# Patient Record
Sex: MALE | Race: WHITE
[De-identification: names, ages, dates, MRNs, and addresses within clinical notes are randomized per-mention and may not be internally consistent; named-entity substitution may affect disease eponyms.]

---

## 2018-06-28 ENCOUNTER — HOSPITAL ENCOUNTER (OUTPATIENT)
Dept: HOSPITAL 88 - MRI | Age: 48
End: 2018-06-28
Attending: FAMILY MEDICINE
Payer: COMMERCIAL

## 2018-06-28 DIAGNOSIS — S86.012A: Primary | ICD-10-CM

## 2018-06-28 NOTE — DIAGNOSTIC IMAGING REPORT
MRI of the left ankle without contrast.



History:  Achilles tendon tear. Heel pain. Decreased range of motion.



Technique: Utilizing a high-field 1.5T magnet, the following sequences were

acquired: PD FS in all 3 planes with additional axial PD.

 

Comparison:  None.



Findings: 

Achilles tendon and plantar fascia: There is thickening and degeneration with

mild midsubstance partial tearing involving the distal Achilles tendon adjacent

to the posterior calcaneal insertion site. There is bone marrow edema in the

posterior calcaneus and there is mild retrocalcaneal bursitis and mild adjacent

soft tissue edema. 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

calcaneus. No full-thickness Achilles tendon tear or retraction is seen.



The plantar fascial tissues are intact. There is an inferior calcaneal bone

spur.



Cartilage and bone: Negative for osteochondral lesion of the tibiotalar and

subtalar joints. Negative for fracture, osteonecrosis, or dislocation.



Medial ankle: There is scarring of the deltoid ligament complex..  The medial

flexor tendons are normal.  There is a physiologic amount of fluid within the

tendon sheath of FHL.



Lateral ankle: There is scarring of the lateral ankle ligamentous structures.

The peroneal tendons are intact.



Anterior ankle: The anterior extensor tendons are normal.



Other findings: There is a tibiotalar joint effusion and synovitis.



Impression:

Thickening and degeneration with mild midsubstance partial tearing involving

the distal Achilles tendon adjacent to the posterior calcaneal insertion site.

There is bone marrow edema in the posterior calcaneus and there is mild

retrocalcaneal bursitis and mild adjacent soft tissue edema. There is a

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 No full-thickness

Achilles tendon tear or retraction is seen.



Scarring of the medial and lateral ankle ligamentous structures likely due to

old trauma.



Tibiotalar joint effusion and synovitis.



Signed by: Dr. Wally Grant M.D. on 6/28/2018 9:06 AM